# Patient Record
Sex: MALE | Race: WHITE | Employment: STUDENT | URBAN - METROPOLITAN AREA
[De-identification: names, ages, dates, MRNs, and addresses within clinical notes are randomized per-mention and may not be internally consistent; named-entity substitution may affect disease eponyms.]

---
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
## 2025-01-07 ENCOUNTER — TELEPHONE (OUTPATIENT)
Dept: PEDIATRICS | Facility: CLINIC | Age: 20
End: 2025-01-07

## 2025-01-07 NOTE — TELEPHONE ENCOUNTER
----- Message from Mario Anderson sent at 1/3/2025  7:57 AM EST -----  Regarding: schedule Well Adult Visit  Let PATIENT know that they are due for Well Adult Care.    Please schedule such as soon as possible.     If unable to reach by phone / portal, please send letter

## 2025-02-05 ENCOUNTER — TELEPHONE (OUTPATIENT)
Dept: PEDIATRICS | Facility: CLINIC | Age: 20
End: 2025-02-05

## 2025-02-05 NOTE — TELEPHONE ENCOUNTER
"Called 1/7/25 [phone] & [phone] both #\"s have been disconnected.  Letter sent     "
----- Message from Mario Anderson sent at 1/23/2025 10:07 AM EST -----  Regarding: schedule Well Adult Visit  Let PATIENT know that they are due for Well Adult Care.    Please schedule such as soon as possible.     If unable to reach by phone / portal, please send letter  
ucx prelim w/ 50-99k klebsiella pneumoniae, UA in ed negative, pt w/out dysuria.

## 2025-03-04 ENCOUNTER — HOSPITAL ENCOUNTER (EMERGENCY)
Facility: HOSPITAL | Age: 20
Discharge: HOME | End: 2025-03-04
Payer: COMMERCIAL

## 2025-03-04 ENCOUNTER — APPOINTMENT (OUTPATIENT)
Dept: RADIOLOGY | Facility: HOSPITAL | Age: 20
End: 2025-03-04
Payer: COMMERCIAL

## 2025-03-04 ENCOUNTER — OFFICE VISIT (OUTPATIENT)
Dept: ORTHOPEDIC SURGERY | Facility: CLINIC | Age: 20
End: 2025-03-04
Payer: COMMERCIAL

## 2025-03-04 VITALS
RESPIRATION RATE: 16 BRPM | TEMPERATURE: 97 F | SYSTOLIC BLOOD PRESSURE: 133 MMHG | WEIGHT: 237 LBS | HEART RATE: 75 BPM | BODY MASS INDEX: 31.41 KG/M2 | DIASTOLIC BLOOD PRESSURE: 62 MMHG | HEIGHT: 73 IN | OXYGEN SATURATION: 100 %

## 2025-03-04 DIAGNOSIS — T14.8XXA SKIN AVULSION: Primary | ICD-10-CM

## 2025-03-04 DIAGNOSIS — T14.8XXA SKIN AVULSION: ICD-10-CM

## 2025-03-04 PROCEDURE — 2500000004 HC RX 250 GENERAL PHARMACY W/ HCPCS (ALT 636 FOR OP/ED): Performed by: PHYSICIAN ASSISTANT

## 2025-03-04 PROCEDURE — 90715 TDAP VACCINE 7 YRS/> IM: CPT | Performed by: PHYSICIAN ASSISTANT

## 2025-03-04 PROCEDURE — 99283 EMERGENCY DEPT VISIT LOW MDM: CPT | Mod: 25

## 2025-03-04 PROCEDURE — 1036F TOBACCO NON-USER: CPT | Performed by: ORTHOPAEDIC SURGERY

## 2025-03-04 PROCEDURE — 99204 OFFICE O/P NEW MOD 45 MIN: CPT | Performed by: ORTHOPAEDIC SURGERY

## 2025-03-04 PROCEDURE — 90471 IMMUNIZATION ADMIN: CPT | Performed by: PHYSICIAN ASSISTANT

## 2025-03-04 PROCEDURE — 73140 X-RAY EXAM OF FINGER(S): CPT | Mod: RT

## 2025-03-04 PROCEDURE — 99214 OFFICE O/P EST MOD 30 MIN: CPT | Mod: 27 | Performed by: ORTHOPAEDIC SURGERY

## 2025-03-04 PROCEDURE — 2500000001 HC RX 250 WO HCPCS SELF ADMINISTERED DRUGS (ALT 637 FOR MEDICARE OP): Performed by: PHYSICIAN ASSISTANT

## 2025-03-04 RX ORDER — IBUPROFEN 600 MG/1
600 TABLET ORAL ONCE
Status: COMPLETED | OUTPATIENT
Start: 2025-03-04 | End: 2025-03-04

## 2025-03-04 RX ORDER — CEPHALEXIN 500 MG/1
500 CAPSULE ORAL 3 TIMES DAILY
Qty: 21 CAPSULE | Refills: 0 | Status: SHIPPED | OUTPATIENT
Start: 2025-03-04 | End: 2025-03-11

## 2025-03-04 RX ADMIN — IBUPROFEN 600 MG: 600 TABLET ORAL at 13:45

## 2025-03-04 RX ADMIN — TETANUS TOXOID, REDUCED DIPHTHERIA TOXOID AND ACELLULAR PERTUSSIS VACCINE, ADSORBED 0.5 ML: 5; 2.5; 8; 8; 2.5 SUSPENSION INTRAMUSCULAR at 13:45

## 2025-03-04 ASSESSMENT — PAIN SCALES - GENERAL
PAINLEVEL_OUTOF10: 7
PAINLEVEL_OUTOF10: 4

## 2025-03-04 ASSESSMENT — PAIN DESCRIPTION - PAIN TYPE: TYPE: ACUTE PAIN

## 2025-03-04 ASSESSMENT — PAIN DESCRIPTION - ORIENTATION: ORIENTATION: RIGHT

## 2025-03-04 ASSESSMENT — PAIN - FUNCTIONAL ASSESSMENT: PAIN_FUNCTIONAL_ASSESSMENT: 0-10

## 2025-03-04 ASSESSMENT — PAIN DESCRIPTION - LOCATION: LOCATION: FINGER (COMMENT WHICH ONE)

## 2025-03-04 ASSESSMENT — PAIN DESCRIPTION - PROGRESSION: CLINICAL_PROGRESSION: NOT CHANGED

## 2025-03-04 ASSESSMENT — PAIN DESCRIPTION - FREQUENCY: FREQUENCY: CONSTANT/CONTINUOUS

## 2025-03-04 ASSESSMENT — LIFESTYLE VARIABLES
EVER FELT BAD OR GUILTY ABOUT YOUR DRINKING: NO
HAVE YOU EVER FELT YOU SHOULD CUT DOWN ON YOUR DRINKING: NO
TOTAL SCORE: 0
EVER HAD A DRINK FIRST THING IN THE MORNING TO STEADY YOUR NERVES TO GET RID OF A HANGOVER: NO
HAVE PEOPLE ANNOYED YOU BY CRITICIZING YOUR DRINKING: NO

## 2025-03-04 ASSESSMENT — COLUMBIA-SUICIDE SEVERITY RATING SCALE - C-SSRS
1. IN THE PAST MONTH, HAVE YOU WISHED YOU WERE DEAD OR WISHED YOU COULD GO TO SLEEP AND NOT WAKE UP?: NO
2. HAVE YOU ACTUALLY HAD ANY THOUGHTS OF KILLING YOURSELF?: NO
6. HAVE YOU EVER DONE ANYTHING, STARTED TO DO ANYTHING, OR PREPARED TO DO ANYTHING TO END YOUR LIFE?: NO

## 2025-03-04 ASSESSMENT — PAIN DESCRIPTION - ONSET: ONSET: SUDDEN

## 2025-03-04 ASSESSMENT — PAIN DESCRIPTION - DESCRIPTORS: DESCRIPTORS: ACHING

## 2025-03-04 NOTE — PROGRESS NOTES
3/4/2025    Chief Complaint   Patient presents with    Right Thumb - Pain     Patient cut finger with a table saw. Xrays @         History of Present Illness:  Patient Tamela Shaver , 19 y.o. male, presents today, 3/4/2025, for evaluation of right thumb  duration.  This was sustained earlier this afternoon when he was using a table saw cutting a piece of vinyl.  He lost control of the object and made contact with the blade.  He had immediate pain and bleeding.  He went to the ER for evaluation.  The wound was washed out, his tetanus shot was updated.  He was sent for orthopedic referral.  He is a left-hand-dominant individual and otherwise healthy. .         Review of Systems:   GENERAL: Negative  GI: Negative  MUSCULOSKELETAL: See HPI  SKIN: Negative  NEURO:  Negative     Physical Exam:  GENERAL:  Alert and oriented to person, place, and time.  No acute distress and breathing comfortably; pleasant and cooperative with the examination.  HEENT:  Head is normocephalic and atraumatic.  NECK:  Supple, no visible swelling.  CARDIOVASCULAR:  No palpable tachycardia.  LUNGS:  No audible wheezing or labored breathing.  ABDOMEN:  Nondistended.  Extremities: Evaluation of the right upper extremity finds the patient to have a palpable radial artery at the wrist with brisk capillary refill to all digits. The patient has intact sensorium to axillary, radial, median and ulnar nerves. There are no signs of infection. There is no evidence of lymphedema or lymphatic streaking. The patient has supple compartments of the right arm, forearm and hand.  There is open wound to the palm space of the digit about 8 mm at largest diameter.  No obvious exposed bone.  No active bleeding.     Imaging/Test Results:  Radiographs reviewed from the  PACS system show no acute fracture or dislocation.  No evidence of retained foreign body.     Assessment:  Right thumb soft tissue avulsion     Plan:  Treatment options were discussed including  both operative and non-operative treatment strategies.  Prescription for Keflex 500 mg 3 times daily dosing for 7 days was sent to the pharmacy.  Patient family instructed to fill this today.  We did discuss operative and nonoperative strategies.  They elect for initial nonoperative management by way of daily dry dressing changes.  He will follow-up next week on Tuesday for repeat clinical exam.  No x-rays necessary upon return.  All questions answered at today's visit.        In a face to face encounter, I performed a history and physical examination, discussed pertinent diagnostic studies if indicated, and discussed diagnosis and management strategies with both the patient and the mid-level provider. I reviewed the mid-level's note and agree with the documented findings and plan of care.  Patient presents today status post tablesaw laceration to right thumb.  He was seen in the emergency department where the wound was washed out but no closure performed.  Left hand dominant.  Tetanus was updated today.  He was not placed on oral antibiotics.  The patient shows combination of partial and full-thickness laceration to the right thumb with exposed subcutaneous tissue in the pulp space.  The zone of discrete full-thickness injury measures approximately 12 mm with the broader zone of injury where there is combination of partial skin loss measuring approximately 16 mm in total diameter.  Treatment options were discussed.  We talked about healing by secondary intention versus surgery for full-thickness skin grafting.  Patient is weighing his options.  As for now he elects for a course of oral antibiotics basic wound care and follow-up with me in the office in 1 week for ongoing discussion.  He declines surgery for full-thickness skin grafting later this week in the hopes he can avoid surgery altogether.  No return to work until cleared by me.

## 2025-03-04 NOTE — DISCHARGE INSTRUCTIONS
Change bandages daily.  Cleanse daily with soap and water.  Apply antibiotic ointment.    Referral has been placed to a specialist that should be able to take care of your injury.

## 2025-03-04 NOTE — ED PROVIDER NOTES
HPI   Chief Complaint   Patient presents with    Finger Laceration     Right thumb laceration from table saw.       Patient presents with injury to his right thumb.  States that he was using a table saw when he accidentally cut his right thumb.  He is very concerned about this injury.  He has been using a Tylenol and compression but the area still bleeding.  Denies any other injury.      History provided by:  Patient          Patient History   Past Medical History:   Diagnosis Date    Other conditions influencing health status 10/01/2019    No prior hospitalizations    Other conditions influencing health status 10/01/2019    Birth of      Past Surgical History:   Procedure Laterality Date    OTHER SURGICAL HISTORY  10/01/2019    Surgery     No family history on file.  Social History     Tobacco Use    Smoking status: Never    Smokeless tobacco: Never   Substance Use Topics    Alcohol use: Never    Drug use: Never       Physical Exam   ED Triage Vitals [25 1308]   Temperature Heart Rate Respirations BP   36.2 °C (97.2 °F) 80 20 123/71      Pulse Ox Temp Source Heart Rate Source Patient Position   100 % Temporal Monitor Sitting      BP Location FiO2 (%)     Left arm --       Physical Exam  Vitals and nursing note reviewed.   Constitutional:       General: He is not in acute distress.     Appearance: Normal appearance. He is obese. He is not ill-appearing or toxic-appearing.   HENT:      Head: Normocephalic.      Right Ear: External ear normal.      Left Ear: External ear normal.      Nose: Nose normal.      Mouth/Throat:      Lips: No lesions.   Eyes:      General: No scleral icterus.     Conjunctiva/sclera: Conjunctivae normal.   Musculoskeletal:      Right hand: Normal range of motion. Normal sensation. There is no disruption of two-point discrimination. Normal capillary refill. Normal pulse.        Hands:    Skin:     General: Skin is warm.      Capillary Refill: Capillary refill takes less than 2  seconds.   Neurological:      General: No focal deficit present.      Mental Status: He is alert and oriented to person, place, and time.      Cranial Nerves: No cranial nerve deficit or facial asymmetry.      Sensory: No sensory deficit.      Motor: No weakness.      Gait: Gait normal.   Psychiatric:         Attention and Perception: Attention and perception normal.         Mood and Affect: Mood and affect normal.         Speech: Speech normal.         Behavior: Behavior normal.         Thought Content: Thought content normal.         Cognition and Memory: Memory normal.         Judgment: Judgment normal.           ED Course & MDM   Diagnoses as of 03/04/25 1846   Skin avulsion                 No data recorded     Harvey Coma Scale Score: 15 (03/04/25 1316 : Uzma Cobos RN)                           Medical Decision Making  Patient presents with injury to his right thumb.  States that he was using a table saw when he accidentally cut his right thumb.  He is very concerned about this injury.  He has been using a Tylenol and compression but the area still bleeding.  Denies any other injury.    Ddx: Abrasion, contusion, avulsion, fracture, other    Exam is consistent with avulsion of the skin.  This will need further evaluation by either plastics or hand specialist.  Patient's tetanus was updated he was given ibuprofen for pain.  X-rays were obtained although I did not have concern for bony injury due to the location of the injury.  X-rays were read by radiologist showing no acute bony abnormality.  Area was bandaged with antibiotic ointment and nonstick dressing and large bulky bandage.  Referral placed to hand specialist for further evaluation and treatment.  This may simply granulate in or he may need a skin graft.  This can all be done as an outpatient.  Patient continued on ibuprofen for pain.  Encouraged to change bandages daily.  Continue to cleanse area with soap and water daily.  Reapply bandages as needed.   Patient discharged home in improved stable condition    Amount and/or Complexity of Data Reviewed  Radiology: ordered and independent interpretation performed. Decision-making details documented in ED Course.    Risk  OTC drugs.  Prescription drug management.  Diagnosis or treatment significantly limited by social determinants of health.        Procedure  Procedures     Sherry Tripp PA-C  03/04/25 2427

## 2025-03-06 ENCOUNTER — TELEPHONE (OUTPATIENT)
Dept: PEDIATRICS | Facility: CLINIC | Age: 20
End: 2025-03-06
Payer: COMMERCIAL

## 2025-03-06 NOTE — TELEPHONE ENCOUNTER
----- Message from Mario Anderson sent at 3/5/2025 10:55 AM EST -----  Regarding: schedule Well Adult Visit  Let PATIENT know that they are due for Well Adult Care.    Please schedule such as soon as possible.     If unable to reach by phone / portal, please send letter

## 2025-03-06 NOTE — TELEPHONE ENCOUNTER
----- Message from Mario Anderson sent at 3/3/2025  9:47 AM EST -----  Regarding: schedule Well Adult Visit  Let PATIENT know that they are due for Well Adult Care.    Please schedule such as soon as possible.     If unable to reach by phone / portal, please send letter

## 2025-03-11 ENCOUNTER — OFFICE VISIT (OUTPATIENT)
Dept: ORTHOPEDIC SURGERY | Facility: CLINIC | Age: 20
End: 2025-03-11
Payer: COMMERCIAL

## 2025-03-11 DIAGNOSIS — T14.8XXA SKIN AVULSION: Primary | ICD-10-CM

## 2025-03-11 PROBLEM — S61.101A: Status: ACTIVE | Noted: 2025-03-12

## 2025-03-11 PROBLEM — S61.011A LACERATION WITHOUT FOREIGN BODY OF RIGHT THUMB WITHOUT DAMAGE TO NAIL, INITIAL ENCOUNTER: Status: ACTIVE | Noted: 2025-03-12

## 2025-03-11 PROCEDURE — 99214 OFFICE O/P EST MOD 30 MIN: CPT | Performed by: ORTHOPAEDIC SURGERY

## 2025-03-11 PROCEDURE — 1036F TOBACCO NON-USER: CPT | Performed by: ORTHOPAEDIC SURGERY

## 2025-03-11 RX ORDER — CEPHALEXIN 500 MG/1
500 CAPSULE ORAL 3 TIMES DAILY
Qty: 15 CAPSULE | Refills: 0 | Status: SHIPPED | OUTPATIENT
Start: 2025-03-11 | End: 2025-03-16

## 2025-03-11 NOTE — LETTER
March 11, 2025     Patient: Tamela Shaver   YOB: 2005   Date of Visit: 3/11/2025       To Whom It May Concern:    It is my medical opinion that Tamela Shaver should remain out of work for the next 4 weeks.  He may return to work on Monday, April 7, 2025.    If you have any questions or concerns, please don't hesitate to call [phone].         Sincerely,        Andre Baldwin, DO

## 2025-03-11 NOTE — H&P (VIEW-ONLY)
3/11/2025    Chief Complaint   Patient presents with    Right Thumb - Pain, Follow-up     Soft tissue avulsion        History of Present Illness:  Patient Tamela Shaver , 19 y.o. male, presents today, 3/11/2025, for evaluation of right thumb  avulsion injury, about 1 week out.  Patient has been compliant with antibiotic regiment.  He states he completed this now.  He denies any fevers, chills, constitutional symptoms.  He has been working on changing the dressing daily .  He had original washout and tetanus shot updated in the ER.       Review of Systems:   GENERAL: Negative  GI: Negative  MUSCULOSKELETAL: See HPI  SKIN: Negative  NEURO:  Negative     Physical Exam:  GENERAL:  Alert and oriented to person, place, and time.  No acute distress and breathing comfortably; pleasant and cooperative with the examination.  HEENT:  Head is normocephalic and atraumatic.  NECK:  Supple, no visible swelling.  CARDIOVASCULAR:  No palpable tachycardia.  LUNGS:  No audible wheezing or labored breathing.  ABDOMEN:  Nondistended.  Extremities: Evaluation of the right upper extremity finds the patient to have a palpable radial artery at the wrist with brisk capillary refill to all digits. The patient has intact sensorium to axillary, radial, median and ulnar nerves. There are no signs of infection. There is no evidence of lymphedema or lymphatic streaking. The patient has supple compartments of the right arm, forearm and hand.  There is persistence of about 8 mm at largest diameter open wound to the palm space of the right thumb.  There is about 50% partial skin necrosis here.  The rest is appropriately healing granulation tissue.  No evidence of obvious purulence.     Imaging/Test Results:  None today.     Assessment:  Right thumb soft tissue avulsion, 1 week out from non-operative management with partial skin necrosis.     Plan:  Treatment options were reviewed including operative and nonoperative strategies.  Patient elects  to forego any additional nonoperative management favor surgical intervention for revision of right thumb amputation with full-thickness hypothenar skin grafting under combination of wide-awake block and digital block anesthesia tomorrow.  Will help coordinate this.  Follow-up with our office  about 7 days postop.  All questions answered today's visit.    In a face to face encounter, I performed a history and physical examination, discussed pertinent diagnostic studies if indicated, and discussed diagnosis and management strategies with both the patient and the mid-level provider. I reviewed the mid-level's note and agree with the documented findings and plan of care.  Patient presents today for ongoing evaluation status post degloving type injury to the right thumb about the pulp space.  On today's exam the zone of tissue that was partially viable at last visit has progressed to full on necrosis.  Considering the zone of necrosis along with the zone of persistent open wound we are now looking at approaching 20 mm of wound spread in the largest dimension.  Recommendations were made for excisional debridement with preparation of recipient site and application of full-thickness skin graft from right hypothenar donor site.  He was given 5 additional days of antibiotics.  Follow-up with me in the office in 5 to 7 days postoperative.  Plan for surgery tomorrow.  Patient is agreeable with this strategy.

## 2025-03-12 ENCOUNTER — ANESTHESIA (OUTPATIENT)
Dept: OPERATING ROOM | Facility: HOSPITAL | Age: 20
End: 2025-03-12
Payer: COMMERCIAL

## 2025-03-12 ENCOUNTER — ANESTHESIA EVENT (OUTPATIENT)
Dept: OPERATING ROOM | Facility: HOSPITAL | Age: 20
End: 2025-03-12
Payer: COMMERCIAL

## 2025-03-12 ENCOUNTER — HOSPITAL ENCOUNTER (OUTPATIENT)
Facility: HOSPITAL | Age: 20
Setting detail: OUTPATIENT SURGERY
Discharge: HOME | End: 2025-03-12
Attending: ORTHOPAEDIC SURGERY | Admitting: ORTHOPAEDIC SURGERY
Payer: COMMERCIAL

## 2025-03-12 VITALS
OXYGEN SATURATION: 99 % | BODY MASS INDEX: 31.68 KG/M2 | HEIGHT: 73 IN | DIASTOLIC BLOOD PRESSURE: 64 MMHG | SYSTOLIC BLOOD PRESSURE: 136 MMHG | TEMPERATURE: 97.9 F | WEIGHT: 239 LBS | HEART RATE: 78 BPM | RESPIRATION RATE: 18 BRPM

## 2025-03-12 DIAGNOSIS — S61.011A LACERATION WITHOUT FOREIGN BODY OF RIGHT THUMB WITHOUT DAMAGE TO NAIL, INITIAL ENCOUNTER: ICD-10-CM

## 2025-03-12 DIAGNOSIS — G89.18 POST-OP PAIN: Primary | ICD-10-CM

## 2025-03-12 DIAGNOSIS — S61.101A UNSPECIFIED OPEN WOUND OF RIGHT THUMB WITH DAMAGE TO NAIL, INITIAL ENCOUNTER: ICD-10-CM

## 2025-03-12 PROCEDURE — 7100000009 HC PHASE TWO TIME - INITIAL BASE CHARGE: Performed by: ORTHOPAEDIC SURGERY

## 2025-03-12 PROCEDURE — 2720000007 HC OR 272 NO HCPCS: Performed by: ORTHOPAEDIC SURGERY

## 2025-03-12 PROCEDURE — 2500000004 HC RX 250 GENERAL PHARMACY W/ HCPCS (ALT 636 FOR OP/ED): Performed by: PHYSICIAN ASSISTANT

## 2025-03-12 PROCEDURE — 3600000007 HC OR TIME - EACH INCREMENTAL 1 MINUTE - PROCEDURE LEVEL TWO: Performed by: ORTHOPAEDIC SURGERY

## 2025-03-12 PROCEDURE — 3600000002 HC OR TIME - INITIAL BASE CHARGE - PROCEDURE LEVEL TWO: Performed by: ORTHOPAEDIC SURGERY

## 2025-03-12 PROCEDURE — 2500000004 HC RX 250 GENERAL PHARMACY W/ HCPCS (ALT 636 FOR OP/ED): Performed by: NURSE ANESTHETIST, CERTIFIED REGISTERED

## 2025-03-12 PROCEDURE — A15240 PR FULL THICK GRFT HEAD,FAC,HAND <20SQC: Performed by: NURSE ANESTHETIST, CERTIFIED REGISTERED

## 2025-03-12 PROCEDURE — 7100000010 HC PHASE TWO TIME - EACH INCREMENTAL 1 MINUTE: Performed by: ORTHOPAEDIC SURGERY

## 2025-03-12 PROCEDURE — 3700000001 HC GENERAL ANESTHESIA TIME - INITIAL BASE CHARGE: Performed by: ORTHOPAEDIC SURGERY

## 2025-03-12 PROCEDURE — 3700000002 HC GENERAL ANESTHESIA TIME - EACH INCREMENTAL 1 MINUTE: Performed by: ORTHOPAEDIC SURGERY

## 2025-03-12 PROCEDURE — A15240 PR FULL THICK GRFT HEAD,FAC,HAND <20SQC: Performed by: ANESTHESIOLOGY

## 2025-03-12 RX ORDER — HYDROCODONE BITARTRATE AND ACETAMINOPHEN 5; 325 MG/1; MG/1
1 TABLET ORAL EVERY 8 HOURS PRN
Qty: 15 TABLET | Refills: 0 | Status: SHIPPED | OUTPATIENT
Start: 2025-03-12 | End: 2025-03-17

## 2025-03-12 RX ORDER — CEFAZOLIN SODIUM 2 G/100ML
2 INJECTION, SOLUTION INTRAVENOUS ONCE
Status: COMPLETED | OUTPATIENT
Start: 2025-03-12 | End: 2025-03-12

## 2025-03-12 RX ADMIN — CEFAZOLIN SODIUM 2 G: 2 INJECTION, SOLUTION INTRAVENOUS at 09:15

## 2025-03-12 RX ADMIN — SODIUM CHLORIDE, POTASSIUM CHLORIDE, SODIUM LACTATE AND CALCIUM CHLORIDE: 600; 310; 30; 20 INJECTION, SOLUTION INTRAVENOUS at 09:10

## 2025-03-12 SDOH — HEALTH STABILITY: MENTAL HEALTH: CURRENT SMOKER: 0

## 2025-03-12 ASSESSMENT — PAIN - FUNCTIONAL ASSESSMENT
PAIN_FUNCTIONAL_ASSESSMENT: 0-10

## 2025-03-12 ASSESSMENT — COLUMBIA-SUICIDE SEVERITY RATING SCALE - C-SSRS
1. IN THE PAST MONTH, HAVE YOU WISHED YOU WERE DEAD OR WISHED YOU COULD GO TO SLEEP AND NOT WAKE UP?: NO
6. HAVE YOU EVER DONE ANYTHING, STARTED TO DO ANYTHING, OR PREPARED TO DO ANYTHING TO END YOUR LIFE?: NO
2. HAVE YOU ACTUALLY HAD ANY THOUGHTS OF KILLING YOURSELF?: NO

## 2025-03-12 ASSESSMENT — PAIN SCALES - GENERAL
PAINLEVEL_OUTOF10: 3
PAINLEVEL_OUTOF10: 3
PAINLEVEL_OUTOF10: 0 - NO PAIN

## 2025-03-12 NOTE — DISCHARGE INSTRUCTIONS
Medication given may have significant effects after discharge. Therefore on the day of surgery:  1) You must be accompanied by a responsible adult upon discharge and for 24 hours after surgery.  Do not drive a motor vehicle, operate machinery, power tools or appliance, drink alcoholic beverages, or make critical decisions for 24 hours.  2) Be aware of dizziness, which may cause a fall. Change positions slowly.  3) Eating: you may resume your regular diet but it is better to increase intake slowly with mild foods and working up to your regular diet. No greasy, fried or spicy foods today.  4) Nausea/Vomiting: Nausea and vomiting may occur as you become more active or begin to increase food intake. If this should happen, decrease activity and return to liquids. If the problem persists, call your surgeon  5) Pain: Your surgeon may have given you a prescription for pain medication. Take pain medication with food as prescribed. Pain medication may cause constipation, so drink plenty of fluids. If your pain medication does not provide adequate relief, call your surgeon  6) Urinating: Notify your surgeon if you have not urinated within 12 hours after discharge  7) Ice: Apply ice to operative site for 20 min 5-6 times a day or use Polar care as instructed  8) Dressing:   []  Remove dressing in 3 Days   []  Leave open to air or apply simple Band-Aid after initial dressing is taken off and incision is dry. (If Steri-Strips are applied, leave them in place.)   []  No baths, hots tubs, pools, or submerging in fresh water sources. Okay to begin showering and normal hand washing after dressing removal.     [x]  Leave dressing in place. Keep dressing/ incision clean and dry.      9) Activity:    Shoulder/ Elbow/Hand:   [x]  Elevate extremity    []  Sling   [] At all times (except for exercises and showering)  [] As needed only for comfort   [] Begin daily motion exercises out of sling as instructed   [x]  Bend and flex  fingers/wrist/elbow frequently   [] Non-weight bearing/No lifting/gripping/squeezing to the surgical limb   [x] No lifting greater than 1 lb until follow-up visit      10) Begin physical therapy if advised by your physician:   [] Before returning to see you doctor    [x] Will discuss possible need at follow-up visit   [] Will be paired with your follow-up visit in [place]    11) Call your doctor at [phone] for an appointment (or follow up as scheduled)    Contact [facility] office if  Increased redness, swelling, drainage of any kind, and/or pain to surgery site.  As well as new onset fevers and or chills.  These could signify an infection.  Calf or thigh tenderness to touch as well as increased swelling or redness.  This could signify a clot formation.  Numbness or tingling to an area around the incision site or below the incision site (toes). Or if the operative extremity becomes cold, blue.  Any rash appears, increased  or new onset nausea/vomiting occur.  This may indicate a reaction to a medication.  Temp is 38.5 C (101F)  12) If you have any concerns or questions, please call [facility] surgeon on call. The 24- hour phone is [phone]  13) If you are unable to contact your surgeon, in an emergency situation, go to the nearest hospital

## 2025-03-12 NOTE — ANESTHESIA POSTPROCEDURE EVALUATION
Patient: Tamela Shaver    Procedure Summary       Date: 03/12/25 Room / Location: [facility] / [facility]    Anesthesia Start: 0910 Anesthesia Stop: 1002    Procedure: RIGHT THUMB EXCISIONAL DEBRIDEMENT WITH FULL THICKNESS HYPOTHENAR SKIN GRAFTING (Right: Thumb) Diagnosis:       Unspecified open wound of right thumb with damage to nail, initial encounter      Laceration without foreign body of right thumb without damage to nail, initial encounter      (Unspecified open wound of right thumb with damage to nail, initial encounter [S61.101A])      (Laceration without foreign body of right thumb without damage to nail, initial encounter [S61.011A])    Surgeons: Andre Baldwin DO Responsible Provider: Lex Cary MD    Anesthesia Type: regional ASA Status: 2            Anesthesia Type: regional    Vitals Value Taken Time   /64 03/12/25 1015   Temp 36.6 °C (97.9 °F) 03/12/25 1015   Pulse 78 03/12/25 1015   Resp 18 03/12/25 1015   SpO2 99 % 03/12/25 1015       Anesthesia Post Evaluation    Patient location during evaluation: PACU  Patient participation: complete - patient participated  Level of consciousness: awake and alert  Pain management: satisfactory to patient  Airway patency: patent  Cardiovascular status: acceptable  Respiratory status: acceptable  Hydration status: acceptable  Postoperative Nausea and Vomiting: none        No notable events documented.

## 2025-03-12 NOTE — ANESTHESIA PREPROCEDURE EVALUATION
Patient: Tamela Shaver    Procedure Information       Date/Time: 03/12/25 0900    Procedure: RIGHT THUMB EXCISIONAL DEBRIDEMENT WITH FULL THICKNESS HYPOTHENAR SKIN GRAFTING (Right: Thumb) - WIDE AWAKE BLOCK + DIGITAL BLOCK DONE BY ANESTHESIA    Location: [facility] / [facility]    Surgeons: Andre Baldwin, DO            Relevant Problems   No relevant active problems       Clinical information reviewed:   Tobacco  Allergies  Meds   Med Hx  Surg Hx   Fam Hx  Soc Hx        NPO Detail:  NPO/Void Status  Carbohydrate Drink Given Prior to Surgery? : N  Date of Last Liquid: 03/12/25  Time of Last Liquid: 0430  Date of Last Solid: 03/11/25  Time of Last Solid: 1600  Last Intake Type: Clear fluids  Time of Last Void: 0721         Physical Exam    Airway  Mallampati: II  TM distance: >3 FB     Cardiovascular   Rhythm: regular  Rate: normal     Dental - normal exam     Pulmonary   Breath sounds clear to auscultation     Abdominal            Anesthesia Plan    History of general anesthesia?: yes  History of complications of general anesthesia?: no    ASA 2     regional     The patient is not a current smoker.    intravenous induction   Anesthetic plan and risks discussed with patient.    Plan discussed with [facility].

## 2025-03-12 NOTE — OP NOTE
RIGHT THUMB EXCISIONAL DEBRIDEMENT WITH FULL THICKNESS HYPOTHENAR SKIN GRAFTING (R) Operative Note     Date: 3/12/2025  OR Location: [facility]    Name: Tamela Shaver : 2005, Age: 19 y.o., MRN: [id number], Sex: male          Preoperative diagnosis: Right thumb full-thickness skin degloving    Postoperative diagnosis: Same    Procedure planned: Right thumb preparation of recipient site to defect measuring 2.5 x 1.5 cm about volar aspect of the pulp space with application of ipsilateral hypothenar full-thickness skin graft.    Procedure performed: Same    Surgeon: Andre Baldwin D.O.    Assistant: PINKY Berry  The physician assistant was present to the entire case. Given the nature of the disease process and the procedure to be performed a skilled surgical assistant was necessary during the case. The assistant was necessary in order to hold retractors and directly assist in the operation. A certified scrub tech was at the back table managing instruments and supplies for the surgical case.    Anesthesia: Digital block monitored by the anesthesia team    Estimated blood loss: Less than 10 cc    Drains: None    Tourniquet: Turnicot to the base of the right thumb    Specimens: None    Implants: None    Indications for procedure: The patient sustained full-thickness skin injury while in the workplace.  He had a portion of the pulp space degloved there was full-thickness skin injury with defect.  Treatment plans were discussed including both operative and nonoperative strategies.  Patient elected to proceed forth with application of full-thickness skin graft.  Informed consent was signed and placed in the chart.    Complications: None noted at the time of surgery    Description of procedure: The patient was taken to the op suite placed in the supine position on the operating table.  A timeout was performed and the right thumb confirmed to be the operative site.  He was carefully positioned on the table in such  a fashion as to pad all bony prominences and peripheral nerves.  He was administered appropriate digital block in the preoperative holding area.  That was working well.  He was prepped and draped in the normal sterile fashion.  Excisional debridement was performed to the open thumb wound preparing the recipient site for the skin graft.  Zones of full-thickness skin necrosis and nonviable subcutaneous tissue were debrided and discarded.  Standard techniques were then used to harvest an elliptical graft from the ipsilateral hypothenar donor site measuring 2.5 x 1.5 cm.  Irrigation was performed to the donor site and recipient site using approximately 3 L of normal sterile saline.  Standard techniques were then used to shave the graft and suture the full-thickness skin graft into the defect achieving excellent tension-free coverage.  The turnicot was relieved.  Viability and hemostasis were confirmed.  Interrupted nylon sutures were used to close the donor site.  Soft dressing was placed.  The patient was allowed to head to recovery stable condition.  Overall he tolerated the procedure well.    Disposition: Stable to PACU              Andre Baldwin  Phone Number: [phone]

## 2025-03-12 NOTE — ANESTHESIA PROCEDURE NOTES
Peripheral Block    Patient location during procedure: pre-op  Start time: 3/12/2025 8:43 AM  End time: 3/12/2025 8:46 AM  Reason for block: at surgeon's request and post-op pain management  Staffing  Performed: attending   Authorized by: Lex Cary MD    Performed by: Lex Cary MD  Preanesthetic Checklist  Completed: patient identified, IV checked, site marked, risks and benefits discussed, surgical consent, monitors and equipment checked, pre-op evaluation and timeout performed   Timeout performed at: 3/12/2025 8:42 AM  Peripheral Block  Patient position: sitting  Block type: other  Laterality: right  Injection technique: single-shot  Local infiltration: bupivicaine  Infiltration strength: 0.5 %  Dose: 25 mL  Needle  Needle type: long-bevel   Needle gauge: 26 G  Assessment  Injection assessment: negative aspiration for heme, no paresthesia on injection and incremental injection  Additional Notes  Digital block right thumb performed as above.

## 2025-03-18 ENCOUNTER — OFFICE VISIT (OUTPATIENT)
Dept: ORTHOPEDIC SURGERY | Facility: CLINIC | Age: 20
End: 2025-03-18
Payer: COMMERCIAL

## 2025-03-18 DIAGNOSIS — T14.8XXA SKIN AVULSION: Primary | ICD-10-CM

## 2025-03-18 PROCEDURE — 99211 OFF/OP EST MAY X REQ PHY/QHP: CPT | Performed by: ORTHOPAEDIC SURGERY

## 2025-03-18 NOTE — PROGRESS NOTES
3/18/2025    Chief Complaint   Patient presents with    Right Thumb - Post-op     Right thumb excisional debridement with grafting 3-12-25       History of Present Illness:  Patient Tamela Shaver , 19 y.o. male, presents today, 3/18/2025, for evaluation of right thumb  revision of amputation with hypothenar full-thickness skin grafting, 6 days postop.  He has been compliant with oral antibiotic regiment.  Minimal soreness and discomfort.  He feels that things have been improving after he had increased pain for the first 2 days postoperatively.  He kept his postoperative dressing intact and claims have been compliant with nonweightbearing restrictions .         Review of Systems:   GENERAL: Negative  GI: Negative  MUSCULOSKELETAL: See HPI  SKIN: Negative  NEURO:  Negative     Physical Exam:  GENERAL:  Alert and oriented to person, place, and time.  No acute distress and breathing comfortably; pleasant and cooperative with the examination.  HEENT:  Head is normocephalic and atraumatic.  NECK:  Supple, no visible swelling.  CARDIOVASCULAR:  No palpable tachycardia.  LUNGS:  No audible wheezing or labored breathing.  ABDOMEN:  Nondistended.  Extremities: The surgical incision is clean, dry, intact, and appears to be healing well.  No active bleeding, erythema, warmth, drainage, or signs of infection.  Appropriate functional ROM demonstrated with flexion/extension of the digits, and flexion/extension/pronosupination of the wrist.  There is evidence of mottling involving about 50% of hypothenar full-thickness at the donor site.  Graft site shows appropriate healing.     Imaging/Test Results:  None today.     Assessment:  Right thumb revision of amputation with hypothenar full-thickness skin grafting, 6 days postop.     Plan:  Mentations were made to retain the sutures.  We discussed continuance of strict nonweightbearing the right upper extremity.  Will give him supplies for daily dry dressing changes of the right  thumb.  Follow-up with our office in 1 week for repeat clinical exam, no x-rays necessary upon return.  Likely remove sutures from the donor site at that time.  All questions answered today's visit.

## 2025-03-25 ENCOUNTER — OFFICE VISIT (OUTPATIENT)
Dept: ORTHOPEDIC SURGERY | Facility: CLINIC | Age: 20
End: 2025-03-25
Payer: COMMERCIAL

## 2025-03-25 DIAGNOSIS — T14.8XXA SKIN AVULSION: Primary | ICD-10-CM

## 2025-03-25 PROCEDURE — 99211 OFF/OP EST MAY X REQ PHY/QHP: CPT | Performed by: ORTHOPAEDIC SURGERY

## 2025-03-25 NOTE — LETTER
March 25, 2025     Patient: Tamela Shaver   YOB: 2005   Date of Visit: 3/25/2025       To Whom It May Concern:    It is my medical opinion that Tamela Shaver may return to work on 04/14/2024, estimated .    If you have any questions or concerns, please don't hesitate to call [phone].         Sincerely,        Andre Baldwin, DO

## 2025-03-25 NOTE — PROGRESS NOTES
3/25/2025    Chief Complaint   Patient presents with    Right Thumb - Post-op     Right thumb excisional debridement with grafting 3-12-25       History of Present Illness:  Patient Tamela Shaver , 19 y.o. male, presents today, 3/25/2025, for evaluation of right thumb  revision of amputation with hypothenar skin grafting, 13 days postop.  He is done well in the interim since surgery.  Since last visit he has been changing the dressing once daily.  Denies any fevers, chills, constitutional symptoms.  Pain and discomfort are decreasing with time.  No new injury or trauma reported .         Review of Systems:   GENERAL: Negative  GI: Negative  MUSCULOSKELETAL: See HPI  SKIN: Negative  NEURO:  Negative     Physical Exam:  GENERAL:  Alert and oriented to person, place, and time.  No acute distress and breathing comfortably; pleasant and cooperative with the examination.  HEENT:  Head is normocephalic and atraumatic.  NECK:  Supple, no visible swelling.  CARDIOVASCULAR:  No palpable tachycardia.  LUNGS:  No audible wheezing or labored breathing.  ABDOMEN:  Nondistended.  Extremities: The surgical incision is clean, dry, intact, and appears to be healing well.  No active bleeding, erythema, warmth, drainage, or signs of infection.  Appropriate functional ROM demonstrated with flexion/extension of the digits, and flexion/extension/pronosupination of the wrist.  Both donor site and recipient site are healing well.  There is no surrounding erythema.  There is 100% take at the recipient site of the thumb pulp.  He demonstrates intact range of motion of flexion extension across the thumb.     Imaging/Test Results:  None today.     Assessment:  Right thumb revision of amputation with hypothenar full-thickness skin grafting, approximately 2 weeks postop.     Plan:  Recommendations are made for suture removal at the donor site.  This performed and tolerated well.  Continue with simple Band-Aid dressing as needed.  He can wean  from this over the coming days.  Work home program for gentle motion recovery.  Follow-up again in 2 weeks for repeat clinical exam.  Will withhold him from work until that time and extend return to work date on his paperwork until 4/14/2025.  All questions answered today's visit.  No x-rays necessary upon return.      Emily Ward PA-C

## 2025-03-27 ENCOUNTER — APPOINTMENT (OUTPATIENT)
Dept: ORTHOPEDIC SURGERY | Facility: CLINIC | Age: 20
End: 2025-03-27
Payer: COMMERCIAL

## 2025-04-08 ENCOUNTER — OFFICE VISIT (OUTPATIENT)
Dept: ORTHOPEDIC SURGERY | Facility: CLINIC | Age: 20
End: 2025-04-08
Payer: COMMERCIAL

## 2025-04-08 ENCOUNTER — TELEPHONE (OUTPATIENT)
Dept: ORTHOPEDIC SURGERY | Facility: CLINIC | Age: 20
End: 2025-04-08

## 2025-04-08 DIAGNOSIS — T14.8XXA SKIN AVULSION: Primary | ICD-10-CM

## 2025-04-08 NOTE — TELEPHONE ENCOUNTER
Patient has question regarding his right hand due to his injury please give him a call at [phone]

## 2025-04-08 NOTE — LETTER
April 8, 2025     Patient: Tamela Shaver   YOB: 2005   Date of Visit: 4/8/2025       To Whom It May Concern:    It is my medical opinion that Tamela Shaver may return to work on 04/14/2025 with no restrictions .    If you have any questions or concerns, please don't hesitate to call [phone].         Sincerely,        Andre Baldwin, DO

## 2025-04-08 NOTE — PROGRESS NOTES
4/8/2025    Chief Complaint   Patient presents with    Right Thumb - Post-op     Right thumb excisional debridement with grafting 3-12-25       History of Present Illness:  Patient Tamela Shaver , 19 y.o. male, presents today, 4/8/2025, for evaluation of right thumb  hypothenar full-thickness skin grafting, 4 weeks out from surgery.  He has done well in the interim since last visit.  He is inquire about returning to work.  He feels that he is having minimal pain and discomfort overall.  He has some numbness and some occasional sensitivity at the tip of the digit but feels is much improved .         Review of Systems:   GENERAL: Negative  GI: Negative  MUSCULOSKELETAL: See HPI  SKIN: Negative  NEURO:  Negative     Physical Exam:  GENERAL:  Alert and oriented to person, place, and time.  No acute distress and breathing comfortably; pleasant and cooperative with the examination.  HEENT:  Head is normocephalic and atraumatic.  NECK:  Supple, no visible swelling.  CARDIOVASCULAR:  No palpable tachycardia.  LUNGS:  No audible wheezing or labored breathing.  ABDOMEN:  Nondistended.  Extremities: Evaluation of the right upper extremity finds the patient to have a palpable radial artery at the wrist with brisk capillary refill to all digits. The patient has intact sensorium to axillary, radial, median and ulnar nerves. There are no open wounds. There are no signs of infection. There is no evidence of lymphedema or lymphatic streaking. The patient has supple compartments of the right arm, forearm and hand.  The donor site shows appropriate healing.  The recipient site shows 100% take.     Imaging/Test Results:  None today.     Assessment:  Right thumb full-thickness hypothenar skin grafting, 4 weeks out doing well.     Plan:  Progressive weightbearing activities as tolerated to the right upper extremity.  Continue with home program for gentle massage, desensitization, and range of motion recovery.  Given note to return  to work on Monday, 4/14/2025, without restrictions.  Follow-up with our office in as-needed basis.  All questions answered today's visit.      Emily Ward PA-C

## 2025-04-08 NOTE — TELEPHONE ENCOUNTER
Called patient back, he forgot to ask if he could start working out agtain, I told him yes that should be fine, do the lifting and exercises to your tolerance, if it hurts don't do it.  Then he said he was told to massage it and rub his thumb against things to get feeling back and states the stitches get caught on some things, his mom told him the stitches should have dissolved by now, I told him no these stitches can take a couple of months to dissolve, but if they start to unravel and stick out he can trim them back.  Verbalized understanding.

## 2025-04-10 ENCOUNTER — APPOINTMENT (OUTPATIENT)
Dept: ORTHOPEDIC SURGERY | Facility: CLINIC | Age: 20
End: 2025-04-10
Payer: COMMERCIAL

## 2025-06-12 ENCOUNTER — TELEPHONE (OUTPATIENT)
Dept: PEDIATRICS | Facility: CLINIC | Age: 20
End: 2025-06-12

## 2025-06-12 ENCOUNTER — APPOINTMENT (OUTPATIENT)
Dept: PEDIATRICS | Facility: CLINIC | Age: 20
End: 2025-06-12
Payer: COMMERCIAL

## 2025-06-12 NOTE — TELEPHONE ENCOUNTER
----- Message from Mario Anderson sent at 6/12/2025  8:43 AM EDT -----  Regarding: no show / reschedule  Please call patient and let him know that we are aware that he cancelled his well adult visit appointment today,  If he is followed by another provider, please let us know and we will update our records, otherwise, please re-schedule [facility] as it has been four years since his last well visit

## 2025-07-31 ENCOUNTER — TELEPHONE (OUTPATIENT)
Dept: PEDIATRICS | Facility: CLINIC | Age: 20
End: 2025-07-31
Payer: COMMERCIAL

## 2025-07-31 NOTE — TELEPHONE ENCOUNTER
----- Message from Mario Anderson sent at 7/30/2025 10:53 AM EDT -----  Regarding: schedule Well Adult Visit  Let PATIENT know that they are due for [facility].    Based on our records, patient is quite  behind on their medical care and/or on immunizations.  If patient is followed by another provider, let us know and we will update our records.    Otherwise, please schedule a Well Visit as soon as possible.     If unable to reach by phone / portal, please send letter

## 2025-08-28 ENCOUNTER — TELEPHONE (OUTPATIENT)
Dept: PEDIATRICS | Facility: CLINIC | Age: 20
End: 2025-08-28
Payer: COMMERCIAL

## (undated) DEVICE — SUTURE, ETHILON, 4-0, BLK, MONO, PS-2 18

## (undated) DEVICE — SUTURE, ETHILON, 3-0, 18 IN, PS2, BLACK

## (undated) DEVICE — TOWEL PACK, STERILE, 4/PACK, BLUE

## (undated) DEVICE — BANDAGE, COHESIVE, HAND TEAR, COFLEX, 2 X 5 YDS, LF

## (undated) DEVICE — BANDAGE, GAUZE, 6 PLY, KERLIX, 4.5 IN X 4.1 YD, AMD, STERILE

## (undated) DEVICE — CAUTERY, PENCIL, PUSH BUTTON, SMOKE EVAC, 70MM

## (undated) DEVICE — DRESSING, GAUZE, SUPER KERLIX, 6X6

## (undated) DEVICE — APPLICATOR, CHLORAPREP, W/ORANGE TINT, 26ML

## (undated) DEVICE — GLOVE, SURGICAL, PROTEXIS PI MICRO, 7.5, PF, LF

## (undated) DEVICE — DRAPE, SHEET, 17 X 23 IN

## (undated) DEVICE — GLOVE, SURGICAL, PROTEXIS PI MICRO, 7.0, PF, LF

## (undated) DEVICE — Device

## (undated) DEVICE — SOLUTION, IRRIGATION, STERILE WATER, 1000 ML, POUR BOTTLE

## (undated) DEVICE — SOLUTION, IRRIGATION, SODIUM CHLORIDE 0.9%, 1000 ML, POUR BOTTLE

## (undated) DEVICE — DRESSING, NON-ADHERENT, 3 X 3 IN, STERILE